# Patient Record
Sex: MALE | Race: OTHER | NOT HISPANIC OR LATINO | URBAN - METROPOLITAN AREA
[De-identification: names, ages, dates, MRNs, and addresses within clinical notes are randomized per-mention and may not be internally consistent; named-entity substitution may affect disease eponyms.]

---

## 2024-12-08 ENCOUNTER — EMERGENCY (EMERGENCY)
Facility: HOSPITAL | Age: 39
LOS: 0 days | Discharge: HOME | End: 2024-12-08
Attending: STUDENT IN AN ORGANIZED HEALTH CARE EDUCATION/TRAINING PROGRAM
Payer: COMMERCIAL

## 2024-12-08 VITALS
WEIGHT: 315 LBS | RESPIRATION RATE: 19 BRPM | DIASTOLIC BLOOD PRESSURE: 88 MMHG | TEMPERATURE: 98 F | SYSTOLIC BLOOD PRESSURE: 139 MMHG | OXYGEN SATURATION: 98 % | HEART RATE: 100 BPM

## 2024-12-08 DIAGNOSIS — K04.7 PERIAPICAL ABSCESS WITHOUT SINUS: ICD-10-CM

## 2024-12-08 DIAGNOSIS — I10 ESSENTIAL (PRIMARY) HYPERTENSION: ICD-10-CM

## 2024-12-08 DIAGNOSIS — K02.9 DENTAL CARIES, UNSPECIFIED: ICD-10-CM

## 2024-12-08 DIAGNOSIS — E03.9 HYPOTHYROIDISM, UNSPECIFIED: ICD-10-CM

## 2024-12-08 PROCEDURE — 99284 EMERGENCY DEPT VISIT MOD MDM: CPT | Mod: 25

## 2024-12-08 PROCEDURE — 96372 THER/PROPH/DIAG INJ SC/IM: CPT | Mod: XU

## 2024-12-08 PROCEDURE — 41800 DRAINAGE OF GUM LESION: CPT

## 2024-12-08 PROCEDURE — 99284 EMERGENCY DEPT VISIT MOD MDM: CPT

## 2024-12-08 RX ORDER — AMOXICILLIN 250 MG
1 CAPSULE ORAL
Qty: 20 | Refills: 0
Start: 2024-12-08 | End: 2024-12-17

## 2024-12-08 RX ORDER — KETOROLAC TROMETHAMINE 30 MG/ML
30 INJECTION INTRAMUSCULAR; INTRAVENOUS ONCE
Refills: 0 | Status: DISCONTINUED | OUTPATIENT
Start: 2024-12-08 | End: 2024-12-08

## 2024-12-08 RX ORDER — AMOXICILLIN 250 MG
500 CAPSULE ORAL ONCE
Refills: 0 | Status: COMPLETED | OUTPATIENT
Start: 2024-12-08 | End: 2024-12-08

## 2024-12-08 RX ADMIN — Medication 500 MILLIGRAM(S): at 14:27

## 2024-12-08 RX ADMIN — KETOROLAC TROMETHAMINE 30 MILLIGRAM(S): 30 INJECTION INTRAMUSCULAR; INTRAVENOUS at 14:27

## 2024-12-08 NOTE — CONSULT NOTE ADULT - SUBJECTIVE AND OBJECTIVE BOX
Patient is a 39y old  Male who presents with a chief complaint of an abscess on upper left near the molar    HPI:      PAST MEDICAL & SURGICAL HISTORY:    ( - ) heart valve replacement  ( - ) joint replacement  ( - ) pregnancy    MEDICATIONS  (STANDING):    MEDICATIONS  (PRN):      Allergies    No Known Allergies    Intolerances        FAMILY HISTORY:      *SOCIAL HISTORY: (   ) Tobacco; (   ) ETOH    *Last Dental Visit:    Vital Signs Last 24 Hrs  T(C): 36.7 (08 Dec 2024 13:10), Max: 36.7 (08 Dec 2024 13:10)  T(F): 98.1 (08 Dec 2024 13:10), Max: 98.1 (08 Dec 2024 13:10)  HR: 100 (08 Dec 2024 13:10) (100 - 100)  BP: 139/88 (08 Dec 2024 13:10) (139/88 - 139/88)  BP(mean): --  RR: 19 (08 Dec 2024 13:10) (19 - 19)  SpO2: 98% (08 Dec 2024 13:10) (98% - 98%)    Parameters below as of 08 Dec 2024 13:10  Patient On (Oxygen Delivery Method): room air        LABS:                  EOE:  TMJ ( - ) clicks                     ( - ) pops                     ( - ) crepitus             Mandible <<FROM>>             Facial bones and MOM <<grossly intact>>             ( - ) trismus             ( - ) lymphadenopathy             ( - ) swelling             ( - ) asymmetry             ( - ) palpation             ( - ) dyspnea             ( - ) dysphagia             ( - ) loss of consciousness    IOE:  <<permanent>> dentition: <<some carious teeth>>           hard/soft palate: <<No pathology noted>>           tongue/FOM <<No pathology noted>>           labial/buccal mucosa <<No pathology noted>>           ( - ) percussion           ( + ) palpation           ( - ) swelling            ( + ) abscess           ( - ) sinus tract    Dentition present: <<y>>  Mobility: <<n>>  Caries: << y>>         *DENTAL RADIOGRAPHS: None taken    RADIOLOGY & ADDITIONAL STUDIES: N/A    *ASSESSMENT: Nonrestorable tooth #15. Patient states pain started a few days ago. Patient has own private dentist and wanted to get the tooth extracted today, but office is closed.    *PLAN: Incision and drainage with antibiotics    PROCEDURE:   Administered 1.5 carpules of septocaine 1:100K epinephrine and incision and drainage completed. Antibiotics prescribed.  Treatment: Limited Oral Examination    RECOMMENDATIONS:  1) Complete antiobiotic course as prescribed and analgesics as necessary for pain.  2) Dental F/U with outpatient dentist.   3) If any difficulty swallowing/breathing, fever occur, return to ER.     Resident Name: Virgilio DALEY), dental resident

## 2024-12-08 NOTE — ED PROVIDER NOTE - PHYSICAL EXAMINATION
CONSTITUTIONAL: in no apparent distress.   Mouth: Dental abscess noticed in the left upper tooth with no active drainage.  RESPIRATORY: No signs of respiratory distress.   CARDIOVASCULAR: Regular rate and rhythm.   NEURO: A & O x 3. Normal speech. No focal deficit.  PSYCHOLOGICAL: Appropriate mood and affect. Good judgement and insight.

## 2024-12-08 NOTE — ED ADULT NURSE NOTE - DOES PATIENT HAVE ADVANCE DIRECTIVE
[employer]ate [id number]    Dara Samayoa was counseled on smoking cessation. Dara Samayoa smokes approximately 10 cigarettes per day x 4 years. We have had extensive discussion regarding the need to quit smoking, the negative health implications of smoking overall, as well as the impact on Mohan's comorbid conditions. Dara Samayoa does  voice a willingness to quit smoking and we have  set a quit date. We have discussed potential methods for smoking cessation including nicotine replacement via patch, gum or lozenge, behavioral and lifestyle modifications, and follow-up with primary care provider for consideration of medications for smoking cessation as well. We have also discussed additional resources such as CDC site for additional information, quitassist.com, and Quitline [place]. We have discussed the arrangement of follow-up with primary care provider to discuss progress as well as the potential institution of medications if required/desired. The amount of time spent counseling the patient directly regarding smoking cessation is greater than 10 minutes. No

## 2024-12-08 NOTE — ED PROVIDER NOTE - PROGRESS NOTE DETAILS
Dental service came down to evaluate patient and drained the dental abscess.  Patient is stable for discharge.  Will antibiotics to pharmacy.  Patient already has appointment with his own dentist tomorrow.

## 2024-12-08 NOTE — ED PROVIDER NOTE - PATIENT PORTAL LINK FT
You can access the FollowMyHealth Patient Portal offered by [facility] by registering at the following website: [email or web address]. By joining [facility]’s FollowMyHealth portal, you will also be able to view your health information using other applications (apps) compatible with our system.

## 2024-12-08 NOTE — ED PROVIDER NOTE - CLINICAL SUMMARY MEDICAL DECISION MAKING FREE TEXT BOX
39-year-old male, PMH as noted, presents with abscess at left upper molar.  No trismus, fever, shortness of breath or drooling.    Exam as noted above.    Dental consulted, abscess drained without complication.    Impression dental abscess.  Patient is stable for discharge continue outpatient follow-up, with dentist, antibiotics, supportive care and return precautions.  MD home

## 2024-12-08 NOTE — ED PROVIDER NOTE - NSFOLLOWUPINSTRUCTIONS_ED_ALL_ED_FT
Please make sure to follow up with your primary care doctor in 3 days.    Please make sure to keep up the appointment that you have already established with your dentist tomorrow.    Please make sure to monitor your symptoms and go to the nearest emergency department if you are experiencing worsening pain, or fever.

## 2024-12-08 NOTE — ED PROVIDER NOTE - OBJECTIVE STATEMENT
39-year-old male with a past medical history of hypothyroidism and hypertension who presents to the ED with a dental abscess.  Reports that he noticed dental abscess in the left upper tooth area last night, so came to the ED for evaluation.  Denies fever, tongue swelling, and shortness of breath.

## 2024-12-08 NOTE — ED ADULT NURSE NOTE - NS_SISCREENINGSR_GEN_ALL_ED
Negative Hemigard Postcare Instructions: The HEMIGARD strips are to remain completely dry for at least 5-7 days.